# Patient Record
Sex: FEMALE | Race: WHITE | Employment: STUDENT | URBAN - METROPOLITAN AREA
[De-identification: names, ages, dates, MRNs, and addresses within clinical notes are randomized per-mention and may not be internally consistent; named-entity substitution may affect disease eponyms.]

---
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
## 2024-04-26 ENCOUNTER — OFFICE VISIT (OUTPATIENT)
Dept: FAMILY MEDICINE CLINIC | Facility: CLINIC | Age: 19
End: 2024-04-26
Payer: COMMERCIAL

## 2024-04-26 VITALS
WEIGHT: 130 LBS | SYSTOLIC BLOOD PRESSURE: 110 MMHG | DIASTOLIC BLOOD PRESSURE: 80 MMHG | BODY MASS INDEX: 25.52 KG/M2 | OXYGEN SATURATION: 98 % | HEIGHT: 60 IN | RESPIRATION RATE: 16 BRPM | TEMPERATURE: 98 F | HEART RATE: 84 BPM

## 2024-04-26 DIAGNOSIS — J02.9 SORE THROAT: Primary | ICD-10-CM

## 2024-04-26 LAB
CONTROL LINE PRESENT WITH A CLEAR BACKGROUND (YES/NO): YES YES/NO
KIT LOT #: NORMAL NUMERIC
STREP GRP A CUL-SCR: NEGATIVE

## 2024-04-26 PROCEDURE — 3008F BODY MASS INDEX DOCD: CPT | Performed by: NURSE PRACTITIONER

## 2024-04-26 PROCEDURE — 3079F DIAST BP 80-89 MM HG: CPT | Performed by: NURSE PRACTITIONER

## 2024-04-26 PROCEDURE — 3074F SYST BP LT 130 MM HG: CPT | Performed by: NURSE PRACTITIONER

## 2024-04-26 PROCEDURE — 87880 STREP A ASSAY W/OPTIC: CPT | Performed by: NURSE PRACTITIONER

## 2024-04-26 PROCEDURE — 99213 OFFICE O/P EST LOW 20 MIN: CPT | Performed by: NURSE PRACTITIONER

## 2024-04-26 NOTE — PROGRESS NOTES
CHIEF COMPLAINT:     Chief Complaint   Patient presents with    Sore Throat     S/s for 7 days.  Otc meds taken       HPI:   Lety Lorenz is a 18 year old female presents to clinic with complaint of sore throat. Patient has had for 7 days. Patient reports following associated symptoms: white spots in back of throat. Felt feverish in the beginning, not anymore. No nasal congestion, cough.    Denies chills, rash, nausea, headache, ear pain.     Treating symptoms with mucinex. Sore throat not bothering her anymore. Improved symptoms.    Current Outpatient Medications   Medication Sig Dispense Refill    ferrous sulfate 325 (65 FE) MG Oral Tab EC Take 1 tablet (325 mg total) by mouth daily with breakfast.      XULANE 150-35 MCG/24HR Transdermal Patch Weekly Place 1 patch onto the skin once a week.      Cholecalciferol (VITAMIN D) 50 MCG (2000 UT) Oral Cap Take 1 capsule (2,000 Units total) by mouth daily.        No past medical history on file.   Social History:  Social History     Socioeconomic History    Marital status: Single   Tobacco Use    Smoking status: Never    Smokeless tobacco: Never   Vaping Use    Vaping status: Never Used   Substance and Sexual Activity    Alcohol use: Never    Drug use: Never        REVIEW OF SYSTEMS:   GENERAL HEALTH: feels well otherwise, good appetite  SKIN: denies any unusual skin lesions or rashes  HEENT: denies ear pain, See HPI  RESPIRATORY: denies shortness of breath or wheezing  CARDIOVASCULAR: denies chest pain or palpitations   GI: denies vomiting or diarrhea  NEURO: denies dizziness or lightheadedness    EXAM:   /80   Pulse 84   Temp 98.2 °F (36.8 °C) (Oral)   Resp 16   Ht 5' (1.524 m)   Wt 130 lb (59 kg)   LMP 04/23/2024 (Exact Date)   SpO2 98%   BMI 25.39 kg/m²   GENERAL: well developed, well nourished,in no apparent distress  SKIN: no rashes,no suspicious lesions  HEAD: atraumatic, normocephalic  EYES: conjunctiva clear, EOM intact  EARS: TM's  clear, non-injected, no bulging, retraction, or fluid bilaterally  NOSE: nostrils patent, no exudates, nasal mucosa pink and noninflamed  THROAT: oral mucosa pink, moist. Posterior pharynx not erythematous and injected. No exudates. Tonsils 2/4. No trismus, hoarseness, muffled voice, stridor, or uvular deviation.    NECK: supple, non-tender  LUNGS: clear to auscultation bilaterally; no wheezes, rales, or rhonchi. Breathing is non labored.  CARDIO: RRR without murmur  EXTREMITIES: no cyanosis, clubbing or edema  LYMPH: bilateral anterior cervical lymphadenopathy. No  posterior cervical or occipital lymphadenopathy.    Recent Results (from the past 24 hour(s))   Rapid Strep    Collection Time: 04/26/24  9:55 AM   Result Value Ref Range    Strep Grp A Screen Negative Negative    Control Line Present with a clear background (yes/no) yes Yes/No    Kit Lot # [id number] Numeric    Kit Expiration Date 3/1/2025 Date         ASSESSMENT AND PLAN:   Assessment: 1.   Encounter Diagnosis   Name Primary?    Sore throat Yes     Rapid strep screen is negative   1. Sore throat  - Rapid Strep    Plan: Discussed that due to symptoms and negative rapid strep this is most likely viral and does not require antibiotics.   Comfort measures explained and discussed as listed in Patient Instructions  Follow up with PCP in 3-5 days if not improving, condition worsens, or fever greater than or equal to 100.4 persists for 72 hours.      See pt handout    The patient/parent indicates understanding of these issues and agrees to the plan.

## 2024-06-01 DIAGNOSIS — Z30.016 ENCOUNTER FOR INITIAL PRESCRIPTION OF TRANSDERMAL PATCH HORMONAL CONTRACEPTIVE DEVICE: ICD-10-CM

## 2024-06-03 RX ORDER — NORELGESTROMIN AND ETHINYL ESTRADIOL 150; 35 UG/D; UG/D
1 PATCH TRANSDERMAL WEEKLY
Qty: 3 PATCH | Refills: 15 | OUTPATIENT
Start: 2024-06-03

## 2024-08-22 RX ORDER — NORELGESTROMIN AND ETHINYL ESTRADIOL 150; 35 UG/D; UG/D
1 PATCH TRANSDERMAL WEEKLY
Qty: 12 PATCH | Refills: 0 | Status: SHIPPED | OUTPATIENT
Start: 2024-08-22

## 2024-08-22 NOTE — TELEPHONE ENCOUNTER
Name from pharmacy: XULANE 150-35 MCG/DAY PATCH         Will file in chart as: XULANE 150-35 MCG/24HR Transdermal Patch Weekly    Sig: APPLY 1 PATCH ONCE A WEEK    Disp: 12 patch    Refills: 0 (Pharmacy requested: Not specified)    JAVIER    Start: 8/21/2024    Class: Normal    Non-formulary    To pharmacy: NO MORE REFILLS, PLEASE SEND MORE    Last ordered: 2 months ago (6/4/2024) by Lidia Becker MD    Last refill: 6/5/2024    Rx #: [id number]    Gynecology Medication Protocol [id number]/21/2024 12:30 PM    Physical or Pelvic/Breast in past 12 or next 3 mos--VIA MANUAL LOOKUP      [id number] [place], [place] - [address] [phone], [phone]               LOV: 11/27/2023  RTC: 8 2024  Labs: n/a   Last filled: 06/05/2024    Future Appointments   Date Time Provider Department Center   8/30/2024 11:30 AM Lidia Becker MD [facility] [place]

## 2024-08-30 ENCOUNTER — OFFICE VISIT (OUTPATIENT)
Dept: INTERNAL MEDICINE CLINIC | Facility: CLINIC | Age: 19
End: 2024-08-30
Payer: COMMERCIAL

## 2024-08-30 VITALS
RESPIRATION RATE: 14 BRPM | TEMPERATURE: 98 F | DIASTOLIC BLOOD PRESSURE: 72 MMHG | WEIGHT: 131.81 LBS | BODY MASS INDEX: 25.88 KG/M2 | HEIGHT: 60 IN | HEART RATE: 74 BPM | SYSTOLIC BLOOD PRESSURE: 118 MMHG | OXYGEN SATURATION: 98 %

## 2024-08-30 DIAGNOSIS — E06.3 HASHIMOTO THYROIDITIS: ICD-10-CM

## 2024-08-30 DIAGNOSIS — Z00.00 ROUTINE GENERAL MEDICAL EXAMINATION AT A HEALTH CARE FACILITY: Primary | ICD-10-CM

## 2024-08-30 DIAGNOSIS — D50.0 IRON DEFICIENCY ANEMIA DUE TO CHRONIC BLOOD LOSS: ICD-10-CM

## 2024-08-30 DIAGNOSIS — E55.9 VITAMIN D DEFICIENCY: ICD-10-CM

## 2024-08-30 DIAGNOSIS — Z30.014 ENCOUNTER FOR INITIAL PRESCRIPTION OF INTRAUTERINE CONTRACEPTIVE DEVICE (IUD): ICD-10-CM

## 2024-08-30 NOTE — PATIENT INSTRUCTIONS
I recommend the following vaccines -    Annual FLU every September, October.    Stay up to date with COVID vaccines.     You should complete your labs and thyroid ultrasound as soon as possible.

## 2024-08-30 NOTE — PROGRESS NOTES
Lety Lorenz is a 18 year old female.     HPI:   Patient presents for the following issues and physical exam.  Sexual activity - has been sexually active in past 6 months with one male partner. She is currently wearing xulane patch but wants to discuss IUD. She notices the patch peels off a bit and hard to swim with it. . It is inconvenient at times. She is going to study abroad from Jan-March and wants a different mechanism.   Lifestyle - no formal exercise however, she tries to be active. She feels her nutrition is healthy. She is getting good sleep. She needs to drink more water.   She saw her dentist a few months and he noted thyroid enlargement.   Iron deficiency - she is having monthly menses. They last 1 week with heavy flow for 3 days. She stopped iron replacement b/c of constipation and did not like taste of gummies.       REVIEW OF SYSTEMS:   GENERAL HEALTH: feels well otherwise. No f/c  NEURO: denies any headaches, LH, dizzyness, LOC, falls  VISION: denies any blurred or double vision  RESPIRATORY: denies shortness of breath, cough, or congestion  CARDIOVASCULAR: denies chest pain, pressure or palpitations  GI: denies abdominal pain, constipation, diarrhea, n/v, BRBPR, melena  : no dysuria or hematuria. No abnormal vaginal discharge or itching.   SKIN: denies any unusual skin lesions or rashes  PSYCH: mood is stable. Denies depression or anxiety.   EXT: denies edema       Wt Readings from Last 6 Encounters:   08/30/24 131 lb 12.8 oz (59.8 kg) (56%, Z= 0.16)*   04/26/24 130 lb (59 kg) (54%, Z= 0.11)*   11/27/23 130 lb 3.2 oz (59.1 kg) (56%, Z= 0.16)*   08/16/23 128 lb (58.1 kg) (54%, Z= 0.10)*   02/25/23 127 lb (57.6 kg) (54%, Z= 0.11)*     * Growth percentiles are based on CDC (Girls, 2-20 Years) data.       Allergies   Allergen Reactions    Pineapple TONGUE SWELLING    Red Dye SWELLING    Thyme Tightness in Throat       Family History   Problem Relation Age of Onset    Anemia Mother      Diabetes Maternal Grandmother     Cancer Maternal Grandfather         prostate cancer    Diabetes Maternal Grandfather     Dementia Paternal Great-Grandfather     Dementia Paternal Great-Grandmother     Asthma Brother       Past Medical History:    Anxiety      No past surgical history on file.   Social History:    Social History     Socioeconomic History    Marital status: Single   Tobacco Use    Smoking status: Never    Smokeless tobacco: Never   Vaping Use    Vaping status: Never Used   Substance and Sexual Activity    Alcohol use: Not Currently    Drug use: Never   Other Topics Concern    Caffeine Concern No    Exercise No    Seat Belt No    Special Diet No    Stress Concern No    Weight Concern No           EXAM:   /72 (BP Location: Left arm, Patient Position: Sitting, Cuff Size: adult)   Pulse 74   Temp 98 °F (36.7 °C) (Temporal)   Resp 14   Ht 5' (1.524 m)   Wt 131 lb 12.8 oz (59.8 kg)   LMP 07/01/2024 (Exact Date)   SpO2 98%   BMI 25.74 kg/m²   GENERAL: A&O well developed, well nourished,in no apparent distress  SKIN: no rashes,no suspicious lesions  HEENT: atraumatic, MMM, throat is clear  NECK: supple, no jvd, no thyromegaly,   LUNGS: clear to auscultation bilateraly, no c/w/r  CARDIO: RRR without g/m/r  GI: soft non tender nondistended no hsm bs throughout  NEURO: CN 2-12 grossly intact  PSYCH: pleasant  EXTREMITIES: no cyanosis, clubbing or edema        ASSESSMENT AND PLAN:   # I did not note thyromegaly but her dentist did - check US thyroid  # Screen for STD : declines screening  # Vitamin D deficiency - cont supplement  # Iron Deficiency Anemia 2/2 Menorrhagia with regular cycle - re-check iron studies now. Using xulane patches. Cannot tolerate oral iron replacement.   # Hashimoto's - High TPO in 2/2023. Normal TSH in 10/2023  # Health Maintenance: CPX on 8/2024  Family planning: she is thinking about changing xulane to IUD  Stress Management: feels she makayla well with stress.    Indication for ASA (50-58 yo): no indication for ASA  Colon Cancer Screening: no Fhx. Screen at age 45.   Cervical Cancer Screening: screen at age 21  Breast Cancer Screening: no Fhx. Discuss screening at age 40.   Bone Health/Fall Prevention ([place]): educated on dietary calcium/vit D3, weight bearing exercises and fall prevention  Vaccines: counseled on flu and covid. TDAP in 2015.   Hep C screening: Ab neg in 2023      The patient indicates understanding of these issues and agrees to the plan.  The patient is asked to return in 1 year for physical exam   Lidia Becker MD

## 2024-11-29 RX ORDER — NORELGESTROMIN AND ETHINYL ESTRADIOL 35; 150 UG/MG; UG/MG
1 PATCH TRANSDERMAL WEEKLY
Qty: 12 PATCH | Refills: 2 | Status: SHIPPED | OUTPATIENT
Start: 2024-11-29

## 2024-11-29 NOTE — TELEPHONE ENCOUNTER
Name from pharmacy: NORELGESTROM--35 MCG/DAY          Will file in chart as: NORELGESTROMIN-ETH ESTRADIOL 150-35 MCG/24HR Transdermal Patch Weekly    Sig: APPLY 1 PATCH ONCE A WEEK    Disp: 12 patch    Refills: 0 (Pharmacy requested: Not specified)    Start: 11/28/2024    Class: Normal    Non-formulary    Last ordered: 3 months ago (8/22/2024) by Lidia Becker MD    Last refill: 9/9/2024    Rx #: [id number]    Gynecology Medication Protocol Passed 11/28/2024 12:18 AM   Protocol Details Physical or Pelvic/Breast in past 12 or next 3 mos--VIA MANUAL LOOKUP      To be filled at: [id number] - [place], [place] - [address] AT [facility] [address], [phone], [phone]          LOV:08/30/2024  RTC:1 year   Labs:11/27/2023  Last filled:09/09/2024  No future appointments.

## 2025-03-24 ENCOUNTER — HOSPITAL ENCOUNTER (EMERGENCY)
Age: 20
Discharge: HOME | End: 2025-03-24
Attending: EMERGENCY MEDICINE
Payer: COMMERCIAL

## 2025-03-24 ENCOUNTER — TELEMEDICINE (OUTPATIENT)
Dept: TELEHEALTH | Age: 20
End: 2025-03-24
Payer: COMMERCIAL

## 2025-03-24 VITALS
SYSTOLIC BLOOD PRESSURE: 119 MMHG | DIASTOLIC BLOOD PRESSURE: 66 MMHG | WEIGHT: 126 LBS | BODY MASS INDEX: 24.74 KG/M2 | TEMPERATURE: 98 F | HEIGHT: 60 IN | OXYGEN SATURATION: 99 % | HEART RATE: 108 BPM | RESPIRATION RATE: 20 BRPM

## 2025-03-24 DIAGNOSIS — J02.9 SORE THROAT: Primary | ICD-10-CM

## 2025-03-24 DIAGNOSIS — J02.0 STREP PHARYNGITIS: Primary | ICD-10-CM

## 2025-03-24 PROCEDURE — 99283 EMERGENCY DEPT VISIT LOW MDM: CPT

## 2025-03-24 PROCEDURE — 87430 STREP A AG IA: CPT | Performed by: EMERGENCY MEDICINE

## 2025-03-24 PROCEDURE — 87430 STREP A AG IA: CPT

## 2025-03-24 PROCEDURE — 99284 EMERGENCY DEPT VISIT MOD MDM: CPT

## 2025-03-24 NOTE — ED INITIAL ASSESSMENT (HPI)
Patient with sore throat over the past two days, pain worse when swallowing. Fevers at home, last antipyretic last night around 2000.

## 2025-03-24 NOTE — PROGRESS NOTES
Virtual/Telephone Check-In    Lety Lorenz verbally consents to a Virtual/Telephone Check-In service on 03/24/25.  Patient has been referred to the [facility] website at [email or web address] to review the yearly Consent to Treat document.  Patient understands and accepts financial responsibility for any deductible, co-insurance and/or co-pays associated with this service.       Telehealth Verbal Consent   I conducted a telehealth visit with Lety Lorenz today, 03/24/25, which was completed using two-way, real-time interactive audio and video communication. This has been done in good cyn to provide continuity of care in the best interest of the provider-patient relationship, due to the COVID -19 public health crisis/national emergency where restrictions of face-to-face office visits are ongoing. Every conscious effort was taken to allow for sufficient and adequate time to complete the visit.  The patient was made aware of the limitations of the telehealth visit, including treatment limitations as no physical exam could be performed.  The patient was advised to call 911 or to go to the ER in case there was an emergency.  The patient was also advised of the potential privacy & security concerns related to the telehealth platform.   The patient was made aware of where to find [facility]'s notice of privacy practices, telehealth consent form and other related consent forms and documents.  which are located on the [facility] website. The patient verbally agreed to telehealth consent form, related consents and the risks discussed.    Lastly, the patient confirmed that they were in [place].   Included in this visit, time may have been spent reviewing labs, medications, radiology tests and decision making. Appropriate medical decision-making and tests are ordered as detailed in the plan of care above.  Coding/billing information is submitted for this visit based on complexity of care and/or time spent for the  visit.    CHIEF COMPLAINT:  No chief complaint on file.      HPI:  Lety Lorenz is a 19 year old female who presents for a video visit.  Patient reports sore throat for 2-3 days, worsening. L side more painful that R. Fever of 101F, headache, no cough or congestion.  Patient denies exposure to strep or mono, no history of mono.  Patient has tried Motrin and Tylenol for symptoms, which has not seemed to help.     Current Outpatient Medications   Medication Sig Dispense Refill    Norelgestromin-Eth Estradiol 150-35 MCG/24HR Transdermal Patch Weekly Place 1 patch onto the skin once a week. 12 patch 2    Cholecalciferol (VITAMIN D) 50 MCG (2000 UT) Oral Cap Take 1 capsule (2,000 Units total) by mouth daily.       Past Medical History:    Anxiety     No past surgical history on file.    Social History     Socioeconomic History    Marital status: Single   Tobacco Use    Smoking status: Never    Smokeless tobacco: Never   Vaping Use    Vaping status: Never Used   Substance and Sexual Activity    Alcohol use: Yes     Comment: 1 drink/month    Drug use: Never   Other Topics Concern    Caffeine Concern No    Exercise No    Seat Belt No    Special Diet No    Stress Concern No    Weight Concern No        REVIEW OF SYSTEMS:  GENERAL: see HPI  SKIN: no rashes or abnormal skin lesions  HEENT: See HPI  NEURO: + headaches    EXAM:  General: Alert, Well-appearing, and In no acute distress  Respiratory:   Speaking in full sentences comfortably  Normal work of breathing  Head: Normocephalic  Nose: No obvious nasal discharge.  Throat: +muffled voice, tonsils 3+/4 inflamed and erythematous. Unable to fully visualize uvula but mother states that it is deviated  Skin: No obvious rashes or lesions from what observed.     No results found for this or any previous visit (from the past 24 hours).    ASSESSMENT AND PLAN:  Lety Lorenz is a 19 year old female who presents with symptoms that are consistent with    ASSESSMENT:    Encounter Diagnosis   Name Primary?    Sore throat Yes       PLAN: Advised needs in person eval to r/o strep and possibly mono. With tonsillar edema and possible uvular deviation advised ER visit. Pt and parent agreeable, will go to  ER.        Lety Lorenz understands video visit evaluation is not a substitute for face-to-face examination or emergency care. Patient advised to go to ER or call 911 for worsening symptoms or acute distress.

## 2025-03-25 NOTE — DISCHARGE INSTRUCTIONS
Rest and drink plenty of fluids.   Take Tylenol and/or ibuprofen as needed for pain or fever.   Start the antibiotics and make sure to finish the entire prescription.   Do not share glasses, cups, or utensils.   Make sure to change your toothbrush after 48 hours of antibiotic use.    Follow up with your PCP in 1 week.    Follow-up with Dr. Rodriguez for ENT as needed.    Thank you for choosing [facility] for your care.

## 2025-03-25 NOTE — ED PROVIDER NOTES
Patient Seen in: [facility] In [place]      History     Chief Complaint   Patient presents with    Sore Throat     Stated Complaint: sore throat    Subjective:   19-year-old female presents to the emergency department complaint of sore throat.  Patient states she started on Saturday with a sore throat, low-grade fevers, headaches, and painful swallowing.  She has been having worsening of her symptoms over the last few days.  Mom convinced her that she should come in and get seen as she does have a history of frequent strep infections.  She denies any ear pain, ear drainage, nasal congestion, runny nose, difficulty swallowing, voice changes, cough, shortness of breath, or chest pain.    The history is provided by the patient.         Objective:     Past Medical History:    Anxiety              History reviewed. No pertinent surgical history.             Social History     Socioeconomic History    Marital status: Single   Tobacco Use    Smoking status: Never    Smokeless tobacco: Never   Vaping Use    Vaping status: Never Used   Substance and Sexual Activity    Alcohol use: Yes     Comment: 1 drink/month    Drug use: Never   Other Topics Concern    Caffeine Concern No    Exercise No    Seat Belt No    Special Diet No    Stress Concern No    Weight Concern No                  Physical Exam     ED Triage Vitals [03/24/25 1847]   /66   Pulse 108   Resp 20   Temp 98.4 °F (36.9 °C)   Temp src Oral   SpO2 99 %   O2 Device None (Room air)       Current Vitals:   Vital Signs  BP: 119/66  Pulse: 108  Resp: 20  Temp: 98.4 °F (36.9 °C)  Temp src: Oral    Oxygen Therapy  SpO2: 99 %  O2 Device: None (Room air)        Physical Exam  Vitals and nursing note reviewed.   Constitutional:       General: She is not in acute distress.     Appearance: Normal appearance. She is normal weight. She is not ill-appearing.   HENT:      Head: Normocephalic and atraumatic.      Right Ear: Tympanic membrane, ear canal  and external ear normal.      Left Ear: Tympanic membrane, ear canal and external ear normal.      Nose: Nose normal.      Mouth/Throat:      Mouth: Mucous membranes are moist.      Pharynx: Uvula midline. Pharyngeal swelling and posterior oropharyngeal erythema present.      Tonsils: Tonsillar exudate present. 3+ on the right. 3+ on the left.   Eyes:      Conjunctiva/sclera: Conjunctivae normal.      Pupils: Pupils are equal, round, and reactive to light.   Cardiovascular:      Rate and Rhythm: Normal rate and regular rhythm.      Pulses: Normal pulses.      Heart sounds: Normal heart sounds.   Pulmonary:      Effort: Pulmonary effort is normal. No respiratory distress.      Breath sounds: Normal breath sounds.   Musculoskeletal:         General: Normal range of motion.      Cervical back: Normal range of motion and neck supple.   Lymphadenopathy:      Cervical: No cervical adenopathy.   Skin:     General: Skin is warm and dry.   Neurological:      General: No focal deficit present.      Mental Status: She is alert and oriented to person, place, and time.   Psychiatric:         Mood and Affect: Mood normal.         Behavior: Behavior normal.           ED Course     Labs Reviewed   RAPID STREP A SCREEN () - Abnormal; Notable for the following components:       Result Value    Rapid Strep A Result Positive for Beta Streptococcus, Group A (*)     All other components within normal limits    Narrative:     Susceptibility not performed. Beta Streptococcus Group A remains universally susceptible to penicillin.       ED Course as of 03/24/25 2010  ------------------------------------------------------------  Time: 03/24 1957  Value: Rapid Strep A Screen ()(!)  Comment: Positive for strep.          [facility]        Medical Decision Making  19-year-old female with sore throat that started on Saturday.  Rapid strep was ordered in triage.  Patient is positive for strep.  Will treat with p.o. antibiotics.  Discussed with patient  and parent possibly having patient's tonsils removed given her history of frequent strep infections.  Supportive management at home reviewed including use of Tylenol, and ibuprofen.  Patient is aware that she is contagious until she has been on more than 24 hours of antibiotics.  Recommended patient make sure to change her toothbrush after being on antibiotics for at least 3 days so she does not reinfect herself.  Patient to follow-up with her PCP.  Referral given for ENT for follow-up as needed.  No evidence of sepsis, PTA, or deep neck infection.    Amount and/or Complexity of Data Reviewed  Labs: ordered. Decision-making details documented in ED Course.    Risk  OTC drugs.  Prescription drug management.        Disposition and Plan     Clinical Impression:  1. Strep pharyngitis         Disposition:  Discharge  3/24/2025  8:07 pm    Follow-up:  Lidia Becker MD  [address] [address] [phone]    Follow up in 1 week(s)  As needed    Roxana Rodriguez MD  [facility] [facility] [address] [phone]    Follow up  As needed          Medications Prescribed:  Current Discharge Medication List        START taking these medications    Details   amoxicillin clavulanate 875-125 MG Oral Tab Take 1 tablet by mouth 2 (two) times daily for 7 days.  Qty: 14 tablet, Refills: 0                 Supplementary Documentation:

## 2025-05-30 ENCOUNTER — HOSPITAL ENCOUNTER (OUTPATIENT)
Dept: CT IMAGING | Age: 20
Discharge: HOME | End: 2025-05-30
Attending: INTERNAL MEDICINE
Payer: COMMERCIAL

## 2025-05-30 DIAGNOSIS — R93.89 ABNORMAL ULTRASOUND OF NECK: ICD-10-CM

## 2025-05-30 LAB
CREAT BLD-MCNC: 0.6 MG/DL (ref 0.55–1.02)
EGFRCR SERPLBLD CKD-EPI 2021: 132 ML/MIN/1.73M2 (ref 60–?)

## 2025-05-30 PROCEDURE — 70491 CT SOFT TISSUE NECK W/DYE: CPT | Performed by: INTERNAL MEDICINE

## 2025-05-30 PROCEDURE — 82565 ASSAY OF CREATININE: CPT

## 2025-05-30 RX ORDER — IOHEXOL 350 MG/ML
50 INJECTION, SOLUTION INTRAVENOUS
Status: COMPLETED | OUTPATIENT
Start: 2025-05-30 | End: 2025-05-30

## 2025-05-30 RX ADMIN — IOHEXOL 50 ML: 350 INJECTION, SOLUTION INTRAVENOUS at 14:17:00

## 2025-08-04 ENCOUNTER — PATIENT MESSAGE (OUTPATIENT)
Dept: INTERNAL MEDICINE CLINIC | Facility: CLINIC | Age: 20
End: 2025-08-04